# Patient Record
Sex: FEMALE | Race: BLACK OR AFRICAN AMERICAN
[De-identification: names, ages, dates, MRNs, and addresses within clinical notes are randomized per-mention and may not be internally consistent; named-entity substitution may affect disease eponyms.]

---

## 2015-07-13 VITALS
DIASTOLIC BLOOD PRESSURE: 87 MMHG | SYSTOLIC BLOOD PRESSURE: 168 MMHG | SYSTOLIC BLOOD PRESSURE: 168 MMHG | DIASTOLIC BLOOD PRESSURE: 87 MMHG | DIASTOLIC BLOOD PRESSURE: 87 MMHG | DIASTOLIC BLOOD PRESSURE: 87 MMHG | DIASTOLIC BLOOD PRESSURE: 87 MMHG | SYSTOLIC BLOOD PRESSURE: 168 MMHG | SYSTOLIC BLOOD PRESSURE: 168 MMHG | SYSTOLIC BLOOD PRESSURE: 168 MMHG | SYSTOLIC BLOOD PRESSURE: 168 MMHG | DIASTOLIC BLOOD PRESSURE: 87 MMHG

## 2017-07-25 ENCOUNTER — HOSPITAL ENCOUNTER (OUTPATIENT)
Dept: HOSPITAL 61 - MAMMO | Age: 67
Discharge: HOME | End: 2017-07-25
Attending: FAMILY MEDICINE
Payer: COMMERCIAL

## 2017-07-25 DIAGNOSIS — Z12.31: Primary | ICD-10-CM

## 2017-07-25 NOTE — RAD
DATE: 7/25/2017



EXAM: DIGITAL SCREEN BILAT W/CAD



HISTORY: Routine screening



COMPARISON: 7/22/2016



This study was interpreted with the benefit of Computerized Aided Detection

(CAD).





The breast parenchyma shows scattered fibroglandular densities. Breast

parenchyma level B.





FINDINGS: The fibroglandular densities in the breasts are nodular in

character. The majority of these densities are unchanged. There is a 10 x 6 mm

lobulated density projected over the posterior aspect of the left breast at

the midline on the cc view which does appear to have increased in size since

previous studies. It is not clearly defined on the oblique view. Benign type

calcifications are present. No suspicious microcalcifications have developed.





IMPRESSION: Possible enlarging left breast nodule as described above.

Diagnostic mammograms including spot compression CC and straight medial

lateral views are suggested for further evaluation, as well as possibly left

breast ultrasound.







BI-RADS CATEGORY: 0 INCOMPLETE: NEEDS ADDITIONAL IMAGING EVALUATION AND/OR

PRIOR MAMMOGRAMS FOR COMPARISON.



RECOMMENDED FOLLOW-UP: ADD ADDITIONAL IMAGING



PQRS compliance statement: Patient information was entered into a reminder

system with a target due date     for the next mammogram.



Mammography is a sensitive method for finding small breast cancers, but it

does not detect them all and is not a substitute for careful clinical

examination.  A negative mammogram does not negate a clinically suspicious

finding and should not result in delay in biopsying a clinically suspicious

abnormality.



"Our facility is accredited by the [employer] Mammography

Program."

## 2017-08-04 ENCOUNTER — HOSPITAL ENCOUNTER (OUTPATIENT)
Dept: HOSPITAL 61 - MAMMO | Age: 67
Discharge: HOME | End: 2017-08-04
Attending: FAMILY MEDICINE
Payer: COMMERCIAL

## 2017-08-04 DIAGNOSIS — N63: Primary | ICD-10-CM

## 2017-08-04 NOTE — RAD
DATE: 8/4/2017



EXAM: DIGITAL DIAGNOSTIC LT



HISTORY: Suspicious screening study



COMPARISON: 7/25/2017, 7/22/2016, 7/20/2015, 7/17/2014



This study was interpreted with the benefit of Computerized Aided Detection

(CAD).





The breast parenchyma shows scattered fibroglandular densities. Breast

parenchyma level B.





FINDINGS: Additional spot compression cc and straight mediolateral views were

obtained of the area of concern seen near the midline of the left breast on

the screening CC view. No discrete nodule is seen. The fibroglandular pattern

in the CC projection is similar to that seen on old studies such as 7/17/2014.

No nodule is identified on the spot compression oblique or straight medial

lateral views. The appearance on the screening study appears to have been due

to a summation of fibroglandular shadows.





IMPRESSION: Stable left mammograms without evidence of malignancy.





BI-RADS CATEGORY: 2 BENIGN FINDING(S)



RECOMMENDED FOLLOW-UP: 12M 12 MONTH FOLLOW-UP



PQRS compliance statement: Patient information was entered into a reminder

system with a target due date     for the next mammogram.



Mammography is a sensitive method for finding small breast cancers, but it

does not detect them all and is not a substitute for careful clinical

examination.  A negative mammogram does not negate a clinically suspicious

finding and should not result in delay in biopsying a clinically suspicious

abnormality.



"Our facility is accredited by the [employer] Mammography

Program."

## 2018-08-06 ENCOUNTER — HOSPITAL ENCOUNTER (OUTPATIENT)
Dept: HOSPITAL 61 - MAMMO | Age: 68
Discharge: HOME | End: 2018-08-06
Attending: FAMILY MEDICINE
Payer: COMMERCIAL

## 2018-08-06 DIAGNOSIS — Z12.31: Primary | ICD-10-CM

## 2018-08-06 DIAGNOSIS — I10: ICD-10-CM

## 2018-08-06 PROCEDURE — 77063 BREAST TOMOSYNTHESIS BI: CPT

## 2018-08-06 PROCEDURE — 77067 SCR MAMMO BI INCL CAD: CPT

## 2019-09-07 ENCOUNTER — HOSPITAL ENCOUNTER (OUTPATIENT)
Dept: HOSPITAL 61 - MAMMO | Age: 69
Discharge: HOME | End: 2019-09-07
Attending: FAMILY MEDICINE
Payer: COMMERCIAL

## 2019-09-07 DIAGNOSIS — N63.10: ICD-10-CM

## 2019-09-07 DIAGNOSIS — Z12.31: Primary | ICD-10-CM

## 2019-09-07 DIAGNOSIS — N63.20: ICD-10-CM

## 2019-09-07 PROCEDURE — 77063 BREAST TOMOSYNTHESIS BI: CPT

## 2019-09-07 PROCEDURE — 77067 SCR MAMMO BI INCL CAD: CPT

## 2019-09-09 NOTE — RAD
DATE: 8/6/2018



EXAM: MAMMO JESS SCREENING BILATERAL



HISTORY: Routine screening



COMPARISON: 7/22/2016, 7/25/2017, 8/6/2018 mammographic exams



This study was interpreted with the benefit of Computerized Aided Detection

(CAD).





Breast Density: HETERO The breast parenchyma is heterogenously dense, which

could reduce sensitivity of mammography. Breast parenchyma level C.





FINDINGS: Small masses bilaterally are present and stable. No suspicious

calcifications or distortion. No dominant new mass.  





IMPRESSION: Benign and stable.







BI-RADS CATEGORY: 1 NEGATIVE



RECOMMENDED FOLLOW-UP: 12M 12 MONTH FOLLOW-UP



PQRS compliance statement: Patient information was entered into a reminder

system with a target due date in one year for the next mammogram.



Mammography is a sensitive method for finding small breast cancers, but it

does not detect them all and is not a substitute for careful clinical

examination.  A negative mammogram does not negate a clinically suspicious

finding and should not result in delay in biopsying a clinically suspicious

abnormality.



"Our facility is accredited by the [employer] Mammography

Program."

## 2020-09-03 ENCOUNTER — HOSPITAL ENCOUNTER (OUTPATIENT)
Dept: HOSPITAL 61 - MAMMO | Age: 70
Discharge: HOME | End: 2020-09-03
Attending: FAMILY MEDICINE
Payer: MEDICARE

## 2020-09-03 DIAGNOSIS — Z12.31: Primary | ICD-10-CM

## 2020-09-03 PROCEDURE — 77067 SCR MAMMO BI INCL CAD: CPT

## 2020-09-03 PROCEDURE — 77063 BREAST TOMOSYNTHESIS BI: CPT

## 2020-09-03 NOTE — RAD
DATE: 9/3/2020 9:27 AM



EXAM: MAMMO JESS SCREENING BILATERAL



HISTORY:  Screening



COMPARISON: 9/7/2019, 8/6/2018



Bilateral CC and MLO views of the breasts were performed. Bilateral breast

tomosynthesis was performed in CC and MLO projections.



This study was interpreted with the benefit of Computerized Aided Detection

(CAD).





FINDINGS:



Breast Density: SCATTERED  The breast parenchyma shows scattered

fibroglandular densities. Breast parenchyma level B





No suspicious masses, microcalcifications or architectural distortion is

present to suggest malignancy in either breast.





The visualized axillae are unremarkable. 



IMPRESSION: No mammographic evidence of malignancy. 



BI-RADS CATEGORY: 1 NEGATIVE



RECOMMENDED FOLLOW-UP: 12M 12 MONTH FOLLOW-UP Annual screening mammography is

recommended, unless clinically indicated sooner based on symptoms or change in

physical exam.



PQRS compliance statement: Patient information was entered into a reminder

system with a target due date for the next mammogram.



Mammography is a sensitive method for finding small breast cancers, but it

does not detect them all and is not a substitute for careful clinical

examination.  A negative mammogram does not negate a clinically suspicious

finding and should not result in delay in biopsying a clinically suspicious

abnormality.



"Our facility is accredited by the [employer] Mammography

Program."

## 2021-09-07 ENCOUNTER — HOSPITAL ENCOUNTER (OUTPATIENT)
Dept: HOSPITAL 61 - MAMMO | Age: 71
End: 2021-09-07
Attending: FAMILY MEDICINE
Payer: MEDICARE

## 2021-09-07 DIAGNOSIS — Z12.31: Primary | ICD-10-CM

## 2021-09-07 PROCEDURE — 77063 BREAST TOMOSYNTHESIS BI: CPT

## 2021-09-07 PROCEDURE — 77067 SCR MAMMO BI INCL CAD: CPT
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
## 2021-10-22 ENCOUNTER — HOSPITAL ENCOUNTER (OUTPATIENT)
Dept: HOSPITAL 61 - CT | Age: 71
End: 2021-10-22
Attending: PHYSICIAN ASSISTANT
Payer: MEDICARE

## 2021-10-22 DIAGNOSIS — M16.0: ICD-10-CM

## 2021-10-22 DIAGNOSIS — I70.0: ICD-10-CM

## 2021-10-22 DIAGNOSIS — J84.10: ICD-10-CM

## 2021-10-22 DIAGNOSIS — M51.34: ICD-10-CM

## 2021-10-22 DIAGNOSIS — K44.9: ICD-10-CM

## 2021-10-22 DIAGNOSIS — K42.9: Primary | ICD-10-CM

## 2021-10-22 PROCEDURE — 74176 CT ABD & PELVIS W/O CONTRAST: CPT

## 2021-10-22 NOTE — RAD
Exam: CT abdomen/pelvis without intravenous contrast



Indication: Acute left flank pain left lower quadrant pain



Comparison: CT abdomen pelvis 5/11/2015



Technique: Helical CT imaging performed of the abdomen and pelvis without the use of intravenous cont
rast. Sagittal and coronal reformats were obtained. 



One or more of the following individualized dose reduction techniques were utilized for this examinat
ion:  



1. Automated exposure control

2. Adjustment of the mA and/or kV according to patient size

3. Use of iterative reconstruction technique.



Findings:



Inherently limited evaluation without intravenous contrast.



Lower chest: There is a calcified granuloma in the right lower lobe. Mild atelectasis or scarring in 
the posterior lower lobes. The heart is normal in size. 



Liver: Unremarkable noncontrast appearance of the liver



Gallbladder/Biliary Tree: The gallbladder is surgically absent. Bile ducts are unremarkable.



Pancreas: Normal.



Spleen: Calcified splenic granulomas. No splenomegaly.



Adrenal Glands: Normal.



Kidneys/Ureters/Bladder: Kidneys are normal in size. No nephrolithiasis or hydronephrosis. Proximal u
reters are normal. Mid to distal ureters are partially obscured by streak artifact from multiple surg
ical clips along the iliac chains. No definite ureteral calculus or hydroureter. The bladder is mary
l.



Reproductive Organs: The uterus is surgically absent. The left ovary is normal in appearance. The rig
ht ovary is not visualized.



Stomach, small bowel, and colon: Small hiatal hernia. The stomach is otherwise normal. There is no sm
all bowel obstruction. Colon and appendix are normal.



Vasculature: Abdominal aorta is normal in caliber. Mild scattered calcifications.



Lymph Nodes: No lymphadenopathy.



Peritoneum and retroperitoneum: No free fluid or free air.



Bones: There is mild degenerative disc disease in the lower thoracic spine. Mild degenerative joint d
isease of the hips.



Miscellaneous: Small fat-containing umbilical hernia. 



Impression:



1.  No nephrolithiasis or hydronephrosis. Mid to distal ureters are suboptimally evaluated due to str
eak artifact from multiple surgical clips in the abdomen and pelvis however there is no evidence of o
bstructing ureteral calculus.

2.  Surgical changes of hysterectomy and cholecystectomy.

3.  Small hiatal hernia.

4.  Small fat-containing umbilical hernia.



Electronically signed by: Madalyn Pablo MD (10/22/2021 11:52 AM) [facility]